# Patient Record
Sex: FEMALE | URBAN - METROPOLITAN AREA
[De-identification: names, ages, dates, MRNs, and addresses within clinical notes are randomized per-mention and may not be internally consistent; named-entity substitution may affect disease eponyms.]

---

## 2021-11-23 ENCOUNTER — OFFICE VISIT (OUTPATIENT)
Dept: URBAN - METROPOLITAN AREA CLINIC 3 | Facility: CLINIC | Age: 66
End: 2021-11-23
Payer: MEDICARE

## 2021-11-23 DIAGNOSIS — H01.00A BLEPHARITIS OF RIGHT EYE, UPPER AND LOWER EYELIDS: Primary | ICD-10-CM

## 2021-11-23 DIAGNOSIS — H01.00B BLEPHARITIS OF LT EYE, UPPER AND LOWER EYELIDS: ICD-10-CM

## 2021-11-23 PROCEDURE — 92002 INTRM OPH EXAM NEW PATIENT: CPT | Performed by: OPTOMETRIST

## 2021-11-23 ASSESSMENT — INTRAOCULAR PRESSURE
OS: 18
OD: 18

## 2021-11-23 NOTE — IMPRESSION/PLAN
Impression: Blepharitis of right eye, upper and lower eyelids: H01.00A. Plan: Patient was educated regarding blepharitis condition. The patient was advised to start lid scrubs with either baby shampoo or commercially available OTC lid scrub towelettes. Handout provided to patient. BID x 2 weeks, then to continue once daily. Patient has dry eye due to inadequate aqueous layer of tears. Patient is advised to start Systane Ultra QID. Patient was educated regarding the rebound drying effect of MIRA and therefore Systane Ultra is strongly recommended.

## 2022-03-21 ENCOUNTER — OFFICE VISIT (OUTPATIENT)
Dept: URBAN - METROPOLITAN AREA CLINIC 6 | Facility: CLINIC | Age: 67
End: 2022-03-21
Payer: MEDICARE

## 2022-03-21 DIAGNOSIS — H25.813 COMBINED FORMS OF AGE-RELATED CATARACT, BILATERAL: ICD-10-CM

## 2022-03-21 DIAGNOSIS — H04.123 TEAR FILM INSUFFICIENCY OF BILATERAL LACRIMAL GLANDS: ICD-10-CM

## 2022-03-21 DIAGNOSIS — H10.45 OTHER CHRONIC ALLERGIC CONJUNCTIVITIS: ICD-10-CM

## 2022-03-21 DIAGNOSIS — H35.81 RETINAL COTTON WOOL SPOTS: Primary | ICD-10-CM

## 2022-03-21 PROCEDURE — 92014 COMPRE OPH EXAM EST PT 1/>: CPT | Performed by: OPTOMETRIST

## 2022-03-21 PROCEDURE — 92250 FUNDUS PHOTOGRAPHY W/I&R: CPT | Performed by: OPTOMETRIST

## 2022-03-21 RX ORDER — DESONIDE 0.5 MG/G
0.05 % OINTMENT TOPICAL
Qty: 15 | Refills: 0 | Status: ACTIVE
Start: 2022-03-21

## 2022-03-21 ASSESSMENT — INTRAOCULAR PRESSURE
OD: 17
OS: 16

## 2022-03-21 NOTE — IMPRESSION/PLAN
Impression: Other chronic allergic conjunctivitis: H10.45. Plan: Allergic conjunctivitis both eyes - Discussed with the patient that many of the eye drops that were once prescribed for allergies are now available over the counter (drops with the ingredients ketotifen or olopatadine). [facility] sent Rx for Desonide for itchiness around the eyelid. Discussed NOT putting it in the eye but just on the skin.

## 2022-03-21 NOTE — IMPRESSION/PLAN
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 Plan: Single CWS inferior to the macula in the arcade OS. Could be due to diabetes, hypertension or idiopathic. Baseline Fundus take today. Monitor for now.

## 2022-09-26 ENCOUNTER — OFFICE VISIT (OUTPATIENT)
Dept: URBAN - METROPOLITAN AREA CLINIC 3 | Facility: CLINIC | Age: 67
End: 2022-09-26
Payer: MEDICARE

## 2022-09-26 DIAGNOSIS — H04.123 DRY EYE SYNDROME OF BILATERAL LACRIMAL GLANDS: ICD-10-CM

## 2022-09-26 DIAGNOSIS — H25.813 COMBINED FORMS OF AGE-RELATED CATARACT, BILATERAL: Primary | ICD-10-CM

## 2022-09-26 DIAGNOSIS — H11.153 PINGUECULA, BILATERAL: ICD-10-CM

## 2022-09-26 DIAGNOSIS — H43.393 OTHER VITREOUS OPACITIES, BILATERAL: ICD-10-CM

## 2022-09-26 DIAGNOSIS — H35.81 RETINAL COTTON WOOL SPOTS: ICD-10-CM

## 2022-09-26 PROCEDURE — 99214 OFFICE O/P EST MOD 30 MIN: CPT | Performed by: OPTOMETRIST

## 2022-09-26 ASSESSMENT — INTRAOCULAR PRESSURE
OD: 16
OS: 13